# Patient Record
Sex: FEMALE | Race: WHITE | NOT HISPANIC OR LATINO | Employment: OTHER | URBAN - METROPOLITAN AREA
[De-identification: names, ages, dates, MRNs, and addresses within clinical notes are randomized per-mention and may not be internally consistent; named-entity substitution may affect disease eponyms.]

---

## 2017-01-26 ENCOUNTER — TELEPHONE (OUTPATIENT)
Dept: INTERNAL MEDICINE | Facility: CLINIC | Age: 82
End: 2017-01-26

## 2017-01-26 DIAGNOSIS — I10 ESSENTIAL HYPERTENSION: Primary | Chronic | ICD-10-CM

## 2017-01-26 DIAGNOSIS — E03.9 HYPOTHYROIDISM, UNSPECIFIED TYPE: ICD-10-CM

## 2017-01-26 NOTE — TELEPHONE ENCOUNTER
----- Message from Tawannashruthicassidydarrius Nate sent at 1/26/2017 12:34 PM CST -----  Contact: Self/[phone] home  Type: Orders Request    What orders/ testing are being requested?Lab work    Is there a future appointment scheduled for the patient with PCP?yes    When?03/16/2017    Comments:Patient is calling to request orders for lab work. He would like to speak with someone in the office once orders are placed. Please call and advise.    Thank you!

## 2017-01-26 NOTE — TELEPHONE ENCOUNTER
Pt has EPP sched for 3/16/17- she just had full labs in Dec 2016- does she need anything repeated prior to her visit in March? Please advise

## 2017-02-02 ENCOUNTER — PATIENT MESSAGE (OUTPATIENT)
Dept: INTERNAL MEDICINE | Facility: CLINIC | Age: 82
End: 2017-02-02

## 2017-02-02 DIAGNOSIS — J42 CHRONIC BRONCHITIS, UNSPECIFIED CHRONIC BRONCHITIS TYPE: Primary | ICD-10-CM

## 2017-02-09 ENCOUNTER — OFFICE VISIT (OUTPATIENT)
Dept: INTERNAL MEDICINE | Facility: CLINIC | Age: 82
End: 2017-02-09
Payer: MEDICARE

## 2017-02-09 VITALS
OXYGEN SATURATION: 97 % | HEART RATE: 89 BPM | DIASTOLIC BLOOD PRESSURE: 60 MMHG | SYSTOLIC BLOOD PRESSURE: 110 MMHG | TEMPERATURE: 98 F

## 2017-02-09 DIAGNOSIS — F02.80 ALZHEIMER'S DEMENTIA WITHOUT BEHAVIORAL DISTURBANCE, UNSPECIFIED TIMING OF DEMENTIA ONSET: ICD-10-CM

## 2017-02-09 DIAGNOSIS — L98.9 SKIN LESION: Primary | ICD-10-CM

## 2017-02-09 DIAGNOSIS — I70.0 ATHEROSCLEROSIS OF AORTA: ICD-10-CM

## 2017-02-09 DIAGNOSIS — G30.9 ALZHEIMER'S DEMENTIA WITHOUT BEHAVIORAL DISTURBANCE, UNSPECIFIED TIMING OF DEMENTIA ONSET: ICD-10-CM

## 2017-02-09 DIAGNOSIS — M35.01 KERATOCONJUNCTIVITIS SICCA: ICD-10-CM

## 2017-02-09 DIAGNOSIS — N18.30 CKD (CHRONIC KIDNEY DISEASE) STAGE 3, GFR 30-59 ML/MIN: ICD-10-CM

## 2017-02-09 PROCEDURE — 1157F ADVNC CARE PLAN IN RCRD: CPT | Mod: S$GLB,,, | Performed by: INTERNAL MEDICINE

## 2017-02-09 PROCEDURE — 99499 UNLISTED E&M SERVICE: CPT | Mod: S$GLB,,, | Performed by: INTERNAL MEDICINE

## 2017-02-09 PROCEDURE — 1126F AMNT PAIN NOTED NONE PRSNT: CPT | Mod: S$GLB,,, | Performed by: INTERNAL MEDICINE

## 2017-02-09 PROCEDURE — 1160F RVW MEDS BY RX/DR IN RCRD: CPT | Mod: S$GLB,,, | Performed by: INTERNAL MEDICINE

## 2017-02-09 PROCEDURE — 1159F MED LIST DOCD IN RCRD: CPT | Mod: S$GLB,,, | Performed by: INTERNAL MEDICINE

## 2017-02-09 PROCEDURE — 99213 OFFICE O/P EST LOW 20 MIN: CPT | Mod: S$GLB,,, | Performed by: INTERNAL MEDICINE

## 2017-02-09 PROCEDURE — 99999 PR PBB SHADOW E&M-EST. PATIENT-LVL IV: CPT | Mod: PBBFAC,,, | Performed by: INTERNAL MEDICINE

## 2017-02-09 RX ORDER — MUPIROCIN 20 MG/G
OINTMENT TOPICAL 3 TIMES DAILY
Qty: 30 G | Refills: 1 | Status: SHIPPED | OUTPATIENT
Start: 2017-02-09 | End: 2017-02-19

## 2017-02-09 NOTE — PATIENT INSTRUCTIONS
Will need Dermatology Consult if not improving or resolving. Or if residual skin lesion remains we can have Dermatology evaluate as well.

## 2017-02-09 NOTE — MR AVS SNAPSHOT
[facility] - Internal Medicine  [address]  [facility] [address]  Phone: [phone]  Fax: [phone]                  Catia Carranza   2017 9:30 AM   Office Visit    Description:  Female : 1928   Provider:  Yoseph Dinh MD   Department:  [facility] - Internal Medicine           Reason for Visit     Ear Problem           Diagnoses this Visit        Comments    Skin lesion    -  Primary Right ear, present 4 days or possibly longer.     Alzheimer's dementia without behavioral disturbance, unspecified timing of dementia onset         Atherosclerosis of aorta         CKD (chronic kidney disease) stage 3, GFR 30-59 ml/min         Keratoconjunctivitis sicca     Clinically stable. Pt has completed medication for the eye lid. Follow up with Ophtho.            To Do List           Future Appointments        Provider Department Dept Phone    3/9/2017 10:00 AM LAB, SAME DAY [facility] [phone]    3/16/2017 2:00 PM Yoseph Dinh MD [facility] Internal Medicine [phone]      Goals (5 Years of Data)     None       These Medications        Disp Refills Start End    mupirocin (BACTROBAN) 2 % ointment 30 g 1 2017    Apply topically 3 (three) times daily. - Topical (Top)    Pharmacy: [facility] - [address]  #: [phone]         [facility]     [facility] Nurse Care Line -  Assistance  Registered nurses in the [facility] provide clinical advisement, health education, appointment booking, and other advisory services.  Call for this free service at [phone].             Medications           Message regarding Medications     Verify the changes and/or additions to your medication regime listed below are the same as discussed with your clinician today.  If any of these changes or additions are incorrect, please notify your healthcare provider.        START taking  these NEW medications        Refills    mupirocin (BACTROBAN) 2 % ointment 1    Sig: Apply topically 3 (three) times daily.    Class: Normal    Route: Topical (Top)           Verify that the below list of medications is an accurate representation of the medications you are currently taking.  If none reported, the list may be blank. If incorrect, please contact your healthcare provider. Carry this list with you in case of emergency.           Current Medications     albuterol-ipratropium 2.5mg-0.5mg/3mL (DUO-NEB) 0.5 mg-3 mg(2.5 mg base)/3 mL nebulizer solution Take 3 mLs by nebulization every 6 (six) hours as needed for Wheezing.    atenolol (TENORMIN) 25 MG tablet Take 1 tablet (25 mg total) by mouth once daily.    DOCUSATE CALCIUM (STOOL SOFTENER ORAL) Take by mouth.    estradiol (ESTRACE) 0.01 % (0.1 mg/gram) vaginal cream Use 1 gram per vagina  2 times per week    gabapentin (NEURONTIN) 100 MG capsule Take 1 capsule (100 mg total) by mouth 2 (two) times daily.    hydrocodone-acetaminophen 5-325mg (NORCO) 5-325 mg per tablet Take 1 tablet by mouth 3 (three) times daily.    ketoconazole (NIZORAL) 2 % shampoo Wash scalp 3x/week, allow to soak 5 min prior to rinsing    levothyroxine (SYNTHROID) 150 MCG tablet Take 1 tablet (150 mcg total) by mouth once daily.    lisinopril (PRINIVIL,ZESTRIL) 20 MG tablet Take 1 tablet (20 mg total) by mouth once daily.    memantine (NAMENDA) 10 MG Tab Take 1 tablet (10 mg total) by mouth 2 (two) times daily.    multivitamin-minerals-lutein (CENTRUM SILVER) Tab Take 1 tablet by mouth once daily.     simvastatin (ZOCOR) 40 MG tablet Take 1 tablet (40 mg total) by mouth every evening.    mupirocin (BACTROBAN) 2 % ointment Apply topically 3 (three) times daily.           Clinical Reference Information           Your Vitals Were     BP Pulse Temp SpO2          110/60 (BP Location: Left arm, Patient Position: Sitting, BP Method: Manual) 89 97.5 °F (36.4 °C) (Oral) 97%        Blood  Pressure          Most Recent Value    BP  110/60      Allergies as of 2/9/2017     Actonel [Risedronate]    Adhesive    Other      Immunizations Administered on Date of Encounter - 2/9/2017     None      Instructions    Will need Dermatology Consult if not improving or resolving. Or if residual skin lesion remains we can have Dermatology evaluate as well.        Language Assistance Services     ATTENTION: Language assistance services are available, free of charge. Please call [phone].      ATENCIÓN: Si habla español, tiene a pham disposición servicios gratuitos de asistencia lingüística. Llame al [phone].     JANNETET Ý: N?u b?n nói Ti?ng Vi?t, có các d?ch v? h? tr? ngôn ng? mi?n phí dành cho b?n. G?i s? [phone].         [facility] complies with applicable Federal civil rights laws and does not discriminate on the basis of race, color, national origin, age, disability, or sex.

## 2017-02-09 NOTE — PROGRESS NOTES
Subjective:       Patient ID: Catia Carranza is a 88 y.o. female.    Chief Complaint: Ear Problem (sore on the outside of ear, red)    HPI Comments: History of present illness: Patient brought in by her .  He was concerned because she has a sore irritated spot on the right ear.  He said he noticed her scratching this area and she said it hurt.  When he originally looked at it it was a small crack in the skin but did not seem to be a lesion.  He denies any prior knowledge of it being a skin lesion and certainly was not a ulcer crusted lesion.  He put some cool compresses and topical antibiotic ointment to this area and over the last 2 days the redness has improved a small crust has formed.  She still says mildly sensitive but denied any inner ear pain or fever.     Review of Systems   Constitutional: Negative for fever.   HENT: Negative for congestion.    Musculoskeletal: Positive for arthralgias.   Skin:        Skin lesion right ear   Neurological: Negative for headaches.       Objective:      Physical Exam   Constitutional: She appears well-developed and well-nourished.   HENT:   Head: Normocephalic and atraumatic.   Left Ear: External ear normal.   Mouth/Throat: Oropharynx is clear and moist.   See attached photo of right ear lesion.  There was no tenderness to palpation and no drainage.  There is some mild redness.  The inner ear was unremarkable   Neck: Normal range of motion. Neck supple.   Lymphadenopathy:     She has no cervical adenopathy.           Upper portion of Right Ear, dry, mildly tender. Previously draining per , less red after Bacitracin, Neosporin.   Assessment:       1. Skin lesion    2. Alzheimer's dementia without behavioral disturbance, unspecified timing of dementia onset    3. Atherosclerosis of aorta    4. CKD (chronic kidney disease) stage 3, GFR 30-59 ml/min    5. Keratoconjunctivitis sicca        Plan:       Catia was seen today for ear problem.    Diagnoses and all  orders for this visit:    Skin lesion  Comments:  Right ear, present 4 days or possibly longer.     Alzheimer's dementia without behavioral disturbance, unspecified timing of dementia onset    Atherosclerosis of aorta    CKD (chronic kidney disease) stage 3, GFR 30-59 ml/min    Keratoconjunctivitis sicca  Comments:  Clinically stable. Pt has completed medication for the eye lid. Follow up with Ophtho.     Other orders  -     mupirocin (BACTROBAN) 2 % ointment; Apply topically 3 (three) times daily.        Call if symptoms fail to improve or worsen.  If there is an underlying skin lesion after the redness heels, may consider dermatology evaluation.

## 2017-02-19 ENCOUNTER — NURSE TRIAGE (OUTPATIENT)
Dept: ADMINISTRATIVE | Facility: CLINIC | Age: 82
End: 2017-02-19

## 2017-02-19 NOTE — TELEPHONE ENCOUNTER
Reason for Disposition   Recovered from choking  (all triage questions negative)    Protocols used: [facility] - INHALED FOREIGN BODY-A-AH    Pt inhaled half of a cough drop.  Per  no difficulty breathing, no choking and no other symptoms.  Advised  to monitor pt if she starts with fever, coughing, or difficulty breathing.

## 2017-03-01 ENCOUNTER — PATIENT MESSAGE (OUTPATIENT)
Dept: INTERNAL MEDICINE | Facility: CLINIC | Age: 82
End: 2017-03-01

## 2017-03-01 DIAGNOSIS — H61.91 SKIN LESION OF RIGHT EAR: Primary | ICD-10-CM

## 2017-03-09 ENCOUNTER — OFFICE VISIT (OUTPATIENT)
Dept: DERMATOLOGY | Facility: CLINIC | Age: 82
End: 2017-03-09
Payer: MEDICARE

## 2017-03-09 ENCOUNTER — LAB VISIT (OUTPATIENT)
Dept: LAB | Facility: HOSPITAL | Age: 82
End: 2017-03-09
Attending: INTERNAL MEDICINE
Payer: MEDICARE

## 2017-03-09 ENCOUNTER — PATIENT MESSAGE (OUTPATIENT)
Dept: INTERNAL MEDICINE | Facility: CLINIC | Age: 82
End: 2017-03-09

## 2017-03-09 DIAGNOSIS — H61.001 CDNH (CHONDRODERMATITIS NODULARIS HELICIS), RIGHT: Primary | ICD-10-CM

## 2017-03-09 DIAGNOSIS — E03.9 HYPOTHYROIDISM, UNSPECIFIED TYPE: ICD-10-CM

## 2017-03-09 DIAGNOSIS — I10 ESSENTIAL HYPERTENSION: Chronic | ICD-10-CM

## 2017-03-09 LAB
ANION GAP SERPL CALC-SCNC: 11 MMOL/L
BUN SERPL-MCNC: 29 MG/DL
CALCIUM SERPL-MCNC: 9.1 MG/DL
CHLORIDE SERPL-SCNC: 107 MMOL/L
CO2 SERPL-SCNC: 26 MMOL/L
CREAT SERPL-MCNC: 1.5 MG/DL
EST. GFR  (AFRICAN AMERICAN): 35.6 ML/MIN/1.73 M^2
EST. GFR  (NON AFRICAN AMERICAN): 30.9 ML/MIN/1.73 M^2
GLUCOSE SERPL-MCNC: 128 MG/DL
POTASSIUM SERPL-SCNC: 5.1 MMOL/L
SODIUM SERPL-SCNC: 144 MMOL/L
T4 FREE SERPL-MCNC: 1.53 NG/DL
TSH SERPL DL<=0.005 MIU/L-ACNC: <0.01 UIU/ML

## 2017-03-09 PROCEDURE — 99213 OFFICE O/P EST LOW 20 MIN: CPT | Mod: S$GLB,,, | Performed by: DERMATOLOGY

## 2017-03-09 PROCEDURE — 1160F RVW MEDS BY RX/DR IN RCRD: CPT | Mod: S$GLB,,, | Performed by: DERMATOLOGY

## 2017-03-09 PROCEDURE — 36415 COLL VENOUS BLD VENIPUNCTURE: CPT | Mod: PO

## 2017-03-09 PROCEDURE — 1159F MED LIST DOCD IN RCRD: CPT | Mod: S$GLB,,, | Performed by: DERMATOLOGY

## 2017-03-09 PROCEDURE — 84443 ASSAY THYROID STIM HORMONE: CPT

## 2017-03-09 PROCEDURE — 80048 BASIC METABOLIC PNL TOTAL CA: CPT

## 2017-03-09 PROCEDURE — 99499 UNLISTED E&M SERVICE: CPT | Mod: S$GLB,,, | Performed by: DERMATOLOGY

## 2017-03-09 PROCEDURE — 99999 PR PBB SHADOW E&M-EST. PATIENT-LVL II: CPT | Mod: PBBFAC,,, | Performed by: DERMATOLOGY

## 2017-03-09 PROCEDURE — 1157F ADVNC CARE PLAN IN RCRD: CPT | Mod: S$GLB,,, | Performed by: DERMATOLOGY

## 2017-03-09 PROCEDURE — 84439 ASSAY OF FREE THYROXINE: CPT

## 2017-03-09 RX ORDER — MOMETASONE FUROATE 1 MG/G
OINTMENT TOPICAL 2 TIMES DAILY
Qty: 30 G | Refills: 1 | Status: SHIPPED | OUTPATIENT
Start: 2017-03-09 | End: 2017-06-26

## 2017-03-09 NOTE — MR AVS SNAPSHOT
[facility] - Dermatology   [facility]  [place] [address]  Phone: [phone]  Fax: [phone]                  Catia Carranza   3/9/2017 8:20 AM   Office Visit    Description:  Female : 1928   Provider:  Cary Jacome MD   Department:  [facility] - Dermatology           Reason for Visit     Lesion           Diagnoses this Visit        Comments    CDNH (chondrodermatitis nodularis helicis), right    -  Primary            To Do List           Future Appointments        Provider Department Dept Phone    3/9/2017 8:45 AM LAB, [facility] - Laboratory [phone]    3/16/2017 2:00 PM Yoseph Dinh MD [facility] - Internal Medicine [phone]      Goals (5 Years of Data)     None       These Medications        Disp Refills Start End    mometasone (ELOCON) 0.1 % ointment 30 g 1 3/9/2017 3/19/2017    Apply topically 2 (two) times daily. To right ear lesion - Topical (Top)    Pharmacy: [facility] - [address] Ph #: [phone]         [facility]     [facility] On [facility] Nurse Care Line -  Assistance  Registered nurses in the [facility] provide clinical advisement, health education, appointment booking, and other advisory services.  Call for this free service at [phone].             Medications           Message regarding Medications     Verify the changes and/or additions to your medication regime listed below are the same as discussed with your clinician today.  If any of these changes or additions are incorrect, please notify your healthcare provider.        START taking these NEW medications        Refills    mometasone (ELOCON) 0.1 % ointment 1    Sig: Apply topically 2 (two) times daily. To right ear lesion    Class: Normal    Route: Topical (Top)           Verify that the below list of medications is an accurate representation of the medications you are currently taking.  If none reported, the  list may be blank. If incorrect, please contact your healthcare provider. Carry this list with you in case of emergency.           Current Medications     albuterol-ipratropium 2.5mg-0.5mg/3mL (DUO-NEB) 0.5 mg-3 mg(2.5 mg base)/3 mL nebulizer solution Take 3 mLs by nebulization every 6 (six) hours as needed for Wheezing.    atenolol (TENORMIN) 25 MG tablet Take 1 tablet (25 mg total) by mouth once daily.    DOCUSATE CALCIUM (STOOL SOFTENER ORAL) Take by mouth.    estradiol (ESTRACE) 0.01 % (0.1 mg/gram) vaginal cream Use 1 gram per vagina  2 times per week    gabapentin (NEURONTIN) 100 MG capsule Take 1 capsule (100 mg total) by mouth 2 (two) times daily.    hydrocodone-acetaminophen 5-325mg (NORCO) 5-325 mg per tablet Take 1 tablet by mouth 3 (three) times daily.    ketoconazole (NIZORAL) 2 % shampoo Wash scalp 3x/week, allow to soak 5 min prior to rinsing    levothyroxine (SYNTHROID) 150 MCG tablet Take 1 tablet (150 mcg total) by mouth once daily.    lisinopril (PRINIVIL,ZESTRIL) 20 MG tablet Take 1 tablet (20 mg total) by mouth once daily.    memantine (NAMENDA) 10 MG Tab Take 1 tablet (10 mg total) by mouth 2 (two) times daily.    mometasone (ELOCON) 0.1 % ointment Apply topically 2 (two) times daily. To right ear lesion    multivitamin-minerals-lutein (CENTRUM SILVER) Tab Take 1 tablet by mouth once daily.     simvastatin (ZOCOR) 40 MG tablet Take 1 tablet (40 mg total) by mouth every evening.           Clinical Reference Information           Allergies as of 3/9/2017     Actonel [Risedronate]    Adhesive    Other      Immunizations Administered on Date of Encounter - 3/9/2017     None      Instructions    Discussed contributing factors of pressure     Avoid sleeping on affected side. Consider use of donut pillow or corn remover bandaid to alleviate pressure while sleeping. Can also try a donut shaped pillow for sleeping.    Discussed risks and benefits of treatment options (Cryosurgery, shave removal).  Lesions frequently recur.         Language Assistance Services     ATTENTION: Language assistance services are available, free of charge. Please call [phone].      ATENCIÓN: Si habla camila, tiene a pham disposición servicios gratuitos de asistencia lingüística. Llame al [phone].     CHÚ Ý: N?u b?n nói Ti?ng Vi?t, có các d?ch v? h? tr? ngôn ng? mi?n phí dành cho b?n. G?i s? [phone].         [facility] complies with applicable Federal civil rights laws and does not discriminate on the basis of race, color, national origin, age, disability, or sex.

## 2017-03-09 NOTE — PROGRESS NOTES
Subjective:       Patient ID:  Catia Carranza is a 88 y.o. female who presents for   Chief Complaint   Patient presents with    Lesion     r ear     HPI Comments: Pt presents for lesion to right ear x 2 months.  Painful.  Went to PCP who prescribed topical antibiotic.  Used for 1 month  with no change.  Made worse by pressure.       Lesion         Review of Systems   Constitutional: Negative for fever, chills, fatigue and malaise.   Skin: Negative for daily sunscreen use.   Hematologic/Lymphatic: Does not bruise/bleed easily.        Objective:    Physical Exam   Constitutional: She is in a wheelchair.    HENT:   Ears:    Skin:   Areas Examined (abnormalities noted in diagram):   Head / Face Inspection Performed         Diagram Legend     Erythematous scaling macule/papule c/w actinic keratosis       Vascular papule c/w angioma      Pigmented verrucoid papule/plaque c/w seborrheic keratosis      Yellow umbilicated papule c/w sebaceous hyperplasia      Irregularly shaped tan macule c/w lentigo     1-2 mm smooth white papules consistent with Milia      Movable subcutaneous cyst with punctum c/w epidermal inclusion cyst      Subcutaneous movable cyst c/w pilar cyst      Firm pink to brown papule c/w dermatofibroma      Pedunculated fleshy papule(s) c/w skin tag(s)      Evenly pigmented macule c/w junctional nevus     Mildly variegated pigmented, slightly irregular-bordered macule c/w mildly atypical nevus      Flesh colored to evenly pigmented papule c/w intradermal nevus       Pink pearly papule/plaque c/w basal cell carcinoma      Erythematous hyperkeratotic cursted plaque c/w SCC      Surgical scar with no sign of skin cancer recurrence      Open and closed comedones      Inflammatory papules and pustules      Verrucoid papule consistent consistent with wart     Erythematous eczematous patches and plaques     Dystrophic onycholytic nail with subungual debris c/w onychomycosis     Umbilicated papule     Erythematous-base heme-crusted tan verrucoid plaque consistent with inflamed seborrheic keratosis     Erythematous Silvery Scaling Plaque c/w Psoriasis     See annotation      Assessment / Plan:        CDNH (chondrodermatitis nodularis helicis), right  -     mometasone (ELOCON) 0.1 % ointment; Apply topically 2 (two) times daily. To right ear lesion  Dispense: 30 g; Refill: 1  Discussed contributing factors of pressure and UV damage.     Avoid sleeping on affected side. Consider use of donut pillow, donut shaped bandaids    Discussed risks and benefits of treatment options (Cryosurgery, shave removal). Lesions frequently recur.  Pt has dementia and  is present.  He states she may be difficult to be compliant with bandaids and cream  He will call if she does not improve             Return if symptoms worsen or fail to improve.

## 2017-03-09 NOTE — PATIENT INSTRUCTIONS
Discussed contributing factors of pressure     Avoid sleeping on affected side. Consider use of donut pillow or corn remover bandaid to alleviate pressure while sleeping. Can also try a donut shaped pillow for sleeping.    Discussed risks and benefits of treatment options (Cryosurgery, shave removal). Lesions frequently recur.

## 2017-03-16 ENCOUNTER — OFFICE VISIT (OUTPATIENT)
Dept: INTERNAL MEDICINE | Facility: CLINIC | Age: 82
End: 2017-03-16
Payer: MEDICARE

## 2017-03-16 VITALS — DIASTOLIC BLOOD PRESSURE: 72 MMHG | SYSTOLIC BLOOD PRESSURE: 118 MMHG | HEART RATE: 77 BPM

## 2017-03-16 DIAGNOSIS — Z00.00 ROUTINE PHYSICAL EXAMINATION: Primary | ICD-10-CM

## 2017-03-16 DIAGNOSIS — G89.4 CHRONIC PAIN SYNDROME: ICD-10-CM

## 2017-03-16 DIAGNOSIS — E03.9 HYPOTHYROIDISM, UNSPECIFIED TYPE: ICD-10-CM

## 2017-03-16 DIAGNOSIS — K59.09 CHRONIC CONSTIPATION: ICD-10-CM

## 2017-03-16 DIAGNOSIS — G30.9 ALZHEIMER'S DEMENTIA WITHOUT BEHAVIORAL DISTURBANCE, UNSPECIFIED TIMING OF DEMENTIA ONSET: ICD-10-CM

## 2017-03-16 DIAGNOSIS — E78.5 HYPERLIPIDEMIA, UNSPECIFIED HYPERLIPIDEMIA TYPE: ICD-10-CM

## 2017-03-16 DIAGNOSIS — F02.80 ALZHEIMER'S DEMENTIA WITHOUT BEHAVIORAL DISTURBANCE, UNSPECIFIED TIMING OF DEMENTIA ONSET: ICD-10-CM

## 2017-03-16 DIAGNOSIS — E78.5 DYSLIPIDEMIA: ICD-10-CM

## 2017-03-16 DIAGNOSIS — I70.0 ATHEROSCLEROSIS OF AORTA: ICD-10-CM

## 2017-03-16 DIAGNOSIS — N18.30 CKD (CHRONIC KIDNEY DISEASE) STAGE 3, GFR 30-59 ML/MIN: ICD-10-CM

## 2017-03-16 DIAGNOSIS — F11.90 CHRONIC, CONTINUOUS USE OF OPIOIDS: Chronic | ICD-10-CM

## 2017-03-16 DIAGNOSIS — F41.9 ANXIETY: ICD-10-CM

## 2017-03-16 DIAGNOSIS — I10 ESSENTIAL HYPERTENSION: Chronic | ICD-10-CM

## 2017-03-16 PROCEDURE — 99999 PR PBB SHADOW E&M-EST. PATIENT-LVL III: CPT | Mod: PBBFAC,,, | Performed by: INTERNAL MEDICINE

## 2017-03-16 PROCEDURE — 99397 PER PM REEVAL EST PAT 65+ YR: CPT | Mod: S$GLB,,, | Performed by: INTERNAL MEDICINE

## 2017-03-16 PROCEDURE — 99499 UNLISTED E&M SERVICE: CPT | Mod: S$GLB,,, | Performed by: INTERNAL MEDICINE

## 2017-03-16 RX ORDER — LISINOPRIL 20 MG/1
20 TABLET ORAL DAILY
Qty: 90 TABLET | Refills: 3 | Status: SHIPPED | OUTPATIENT
Start: 2017-03-16 | End: 2018-03-12 | Stop reason: SDUPTHER

## 2017-03-16 RX ORDER — MEMANTINE HYDROCHLORIDE 10 MG/1
10 TABLET ORAL 2 TIMES DAILY
Qty: 180 TABLET | Refills: 3 | Status: SHIPPED | OUTPATIENT
Start: 2017-03-16 | End: 2018-03-12 | Stop reason: SDUPTHER

## 2017-03-16 RX ORDER — LEVOTHYROXINE SODIUM 125 UG/1
125 TABLET ORAL DAILY
Qty: 90 TABLET | Refills: 4 | Status: SHIPPED | OUTPATIENT
Start: 2017-03-16 | End: 2018-03-07 | Stop reason: SDUPTHER

## 2017-03-16 RX ORDER — HYDROCODONE BITARTRATE AND ACETAMINOPHEN 5; 325 MG/1; MG/1
1 TABLET ORAL 3 TIMES DAILY
Qty: 270 TABLET | Refills: 0 | Status: SHIPPED | OUTPATIENT
Start: 2017-03-16 | End: 2017-06-26 | Stop reason: SDUPTHER

## 2017-03-16 RX ORDER — ATENOLOL 25 MG/1
25 TABLET ORAL DAILY
Qty: 90 TABLET | Refills: 4 | Status: SHIPPED | OUTPATIENT
Start: 2017-03-16 | End: 2017-10-09

## 2017-03-16 RX ORDER — GABAPENTIN 100 MG/1
100 CAPSULE ORAL 2 TIMES DAILY
Qty: 180 CAPSULE | Refills: 3 | Status: SHIPPED | OUTPATIENT
Start: 2017-03-16 | End: 2018-03-12 | Stop reason: SDUPTHER

## 2017-03-16 NOTE — PROGRESS NOTES
Subjective:       Patient ID: Catia Carranza is a 88 y.o. female.    Chief Complaint: Annual Exam    HPI Comments: History of present illness: 88-year-old female with moderate dementia is attended by her  Cristian.  She comes in for annual exam.  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  She has had some mild renal physicians and hypothyroidism.  She is fairly well taking care of by her  despite both of their ages.  Periodically there is some help from some children but not on a regular basis.  Review of labs show mild decrease in estimated GFR down to 30.9.  Free T4 is up in TSH is down so I would like to reduce the thyroid medication.  Sugar and other labs are fairly stable.  Patient denies any  complaints but the  says she is constipated at times.  More water and fiber may help.  This may be coming from the pain meds and her inactivity as well     Review of Systems   Constitutional: Negative for appetite change, chills and fever.   HENT: Negative for sore throat.    Respiratory: Negative for cough, shortness of breath and wheezing.    Cardiovascular: Positive for leg swelling (trace). Negative for chest pain.   Gastrointestinal: Positive for constipation. Negative for abdominal pain and diarrhea.   Genitourinary: Negative for difficulty urinating.   Musculoskeletal: Positive for arthralgias, back pain and gait problem. Negative for neck pain and neck stiffness.   Skin: Negative for rash.   Psychiatric/Behavioral: Positive for confusion.       Objective:      Physical Exam   Constitutional: She appears well-developed and well-nourished. No distress.   HENT:   Head: Normocephalic and atraumatic.   Right Ear: External ear normal.   Left Ear: External ear normal.   Mouth/Throat: Oropharynx is clear and moist. No oropharyngeal exudate.   Eyes: Conjunctivae are normal. Pupils are equal, round, and reactive to light. No scleral  icterus.   Neck: Normal range of motion. Neck supple. No thyromegaly present.   Cardiovascular: Normal rate and regular rhythm.    No murmur heard.  Pulmonary/Chest: Effort normal and breath sounds normal. She has no wheezes.   Abdominal: Soft. Bowel sounds are normal. She exhibits no distension. There is no tenderness.   Musculoskeletal: She exhibits edema and tenderness (knees and low back).   Lymphadenopathy:     She has no cervical adenopathy.   Neurological: She is alert.   Skin: No rash noted.   Crusted lesion right ear seen by dermatology and felt to be contact related.  Topical treatment ongoing with possible surgery to follow   Psychiatric: She has a normal mood and affect.       Assessment:       1. Routine physical examination    2. Alzheimer's dementia without behavioral disturbance, unspecified timing of dementia onset    3. Anxiety    4. Chronic pain syndrome    5. Chronic, continuous use of opioids    6. Atherosclerosis of aorta    7. Essential hypertension    8. CKD (chronic kidney disease) stage 3, GFR 30-59 ml/min    9. Chronic constipation    10. Dyslipidemia    11. Hyperlipidemia, unspecified hyperlipidemia type    12. Hypothyroidism, unspecified type        Plan:       Catia was seen today for annual exam.    Diagnoses and all orders for this visit:    Routine physical examination    Alzheimer's dementia without behavioral disturbance, unspecified timing of dementia onset  -     memantine (NAMENDA) 10 MG Tab; Take 1 tablet (10 mg total) by mouth 2 (two) times daily.    Anxiety    Chronic pain syndrome    Chronic, continuous use of opioids    Atherosclerosis of aorta    Essential hypertension  -     atenolol (TENORMIN) 25 MG tablet; Take 1 tablet (25 mg total) by mouth once daily.    CKD (chronic kidney disease) stage 3, GFR 30-59 ml/min  -     Basic metabolic panel; Future    Chronic constipation    Dyslipidemia    Hyperlipidemia, unspecified hyperlipidemia type    Hypothyroidism, unspecified  type  -     TSH; Future    Other orders  -     levothyroxine (SYNTHROID) 125 MCG tablet; Take 1 tablet (125 mcg total) by mouth once daily.  -     lisinopril (PRINIVIL,ZESTRIL) 20 MG tablet; Take 1 tablet (20 mg total) by mouth once daily.  -     gabapentin (NEURONTIN) 100 MG capsule; Take 1 capsule (100 mg total) by mouth 2 (two) times daily.  -     hydrocodone-acetaminophen 5-325mg (NORCO) 5-325 mg per tablet; Take 1 tablet by mouth 3 (three) times daily.        Follow-up labs in a few weeks.  Try to increase hydration with water

## 2017-03-16 NOTE — MR AVS SNAPSHOT
[facility]  [address]  [facility] [address]  Phone: [phone]  Fax: [phone]                  Catia Carranza   3/16/2017 2:00 PM   Office Visit    Description:  Female : 1928   Provider:  Yoseph Dinh MD   Department:  [facility]           Reason for Visit     Annual Exam           Diagnoses this Visit        Comments    Routine physical examination    -  Primary     Alzheimer's dementia without behavioral disturbance, unspecified timing of dementia onset         Anxiety         Chronic pain syndrome         Chronic, continuous use of opioids         Atherosclerosis of aorta         Essential hypertension         CKD (chronic kidney disease) stage 3, GFR 30-59 ml/min         Chronic constipation         Dyslipidemia         Hyperlipidemia, unspecified hyperlipidemia type         Hypothyroidism, unspecified type                To Do List           Future Appointments        Provider Department Dept Phone    2017 1:00 PM LAB, APPOINTMENT [facility] [phone]    2017 1:00 PM Yoseph Dinh MD [facility] [phone]      Goals (5 Years of Data)     None      Follow-Up and Disposition     Return in about 6 weeks (around 2017) for EP, with labs.       These Medications        Disp Refills Start End    levothyroxine (SYNTHROID) 125 MCG tablet 90 tablet 4 3/16/2017     Take 1 tablet (125 mcg total) by mouth once daily. - Oral    Pharmacy: [facility] - [place] [id number] [facility] Ph #: [phone]       memantine (NAMENDA) 10 MG Tab 180 tablet 3 3/16/2017 3/16/2018    Take 1 tablet (10 mg total) by mouth 2 (two) times daily. - Oral    Pharmacy: [facility] - [facility] [address] [facility] Ph #: [phone]       lisinopril (PRINIVIL,ZESTRIL) 20 MG tablet 90 tablet 3 3/16/2017 3/16/2018    Take 1 tablet (20 mg total)  by mouth once daily. - Oral    Pharmacy: [facility] - [facility] [address] [facility] Ph #: [phone]       gabapentin (NEURONTIN) 100 MG capsule 180 capsule 3 3/16/2017 3/16/2018    Take 1 capsule (100 mg total) by mouth 2 (two) times daily. - Oral    Pharmacy: [facility] - [facility] [address] [facility] Ph #: [phone]       atenolol (TENORMIN) 25 MG tablet 90 tablet 4 3/16/2017     Take 1 tablet (25 mg total) by mouth once daily. - Oral    Pharmacy: [facility] - [facility] [address] [facility] Ph #: [phone]       hydrocodone-acetaminophen 5-325mg (NORCO) 5-325 mg per tablet 270 tablet 0 3/16/2017     Take 1 tablet by mouth 3 (three) times daily. - Oral    Pharmacy: [facility] - [facility] [address] [facility] Ph #: [phone]         [facility]     [facility] On Call Nurse Care Line - 24/7 Assistance  Registered nurses in the [facility] On Call Center provide clinical advisement, health education, appointment booking, and other advisory services.  Call for this free service at [phone].             Medications           Message regarding Medications     Verify the changes and/or additions to your medication regime listed below are the same as discussed with your clinician today.  If any of these changes or additions are incorrect, please notify your healthcare provider.        CHANGE how you are taking these medications     Start Taking Instead of    levothyroxine (SYNTHROID) 125 MCG tablet levothyroxine (SYNTHROID) 150 MCG tablet    Dosage:  Take 1 tablet (125 mcg total) by mouth once daily. Dosage:  Take 1 tablet (150 mcg total) by mouth once daily.    Reason for Change:  Reorder            Verify that the below list of medications is an accurate representation of the medications you are currently taking.  If none reported, the list may be blank. If incorrect, please contact your healthcare provider.  Carry this list with you in case of emergency.           Current Medications     albuterol-ipratropium 2.5mg-0.5mg/3mL (DUO-NEB) 0.5 mg-3 mg(2.5 mg base)/3 mL nebulizer solution Take 3 mLs by nebulization every 6 (six) hours as needed for Wheezing.    atenolol (TENORMIN) 25 MG tablet Take 1 tablet (25 mg total) by mouth once daily.    DOCUSATE CALCIUM (STOOL SOFTENER ORAL) Take by mouth.    estradiol (ESTRACE) 0.01 % (0.1 mg/gram) vaginal cream Use 1 gram per vagina  2 times per week    gabapentin (NEURONTIN) 100 MG capsule Take 1 capsule (100 mg total) by mouth 2 (two) times daily.    hydrocodone-acetaminophen 5-325mg (NORCO) 5-325 mg per tablet Take 1 tablet by mouth 3 (three) times daily.    ketoconazole (NIZORAL) 2 % shampoo Wash scalp 3x/week, allow to soak 5 min prior to rinsing    levothyroxine (SYNTHROID) 125 MCG tablet Take 1 tablet (125 mcg total) by mouth once daily.    memantine (NAMENDA) 10 MG Tab Take 1 tablet (10 mg total) by mouth 2 (two) times daily.    mometasone (ELOCON) 0.1 % ointment Apply topically 2 (two) times daily. To right ear lesion    multivitamin-minerals-lutein (CENTRUM SILVER) Tab Take 1 tablet by mouth once daily.     simvastatin (ZOCOR) 40 MG tablet Take 1 tablet (40 mg total) by mouth every evening.    lisinopril (PRINIVIL,ZESTRIL) 20 MG tablet Take 1 tablet (20 mg total) by mouth once daily.           Clinical Reference Information           Your Vitals Were     BP Pulse                118/72 77          Blood Pressure          Most Recent Value    BP  118/72      Allergies as of 3/16/2017     Actonel [Risedronate]    Adhesive    Other      Immunizations Administered on Date of Encounter - 3/16/2017     None      Orders Placed During Today's Visit     Future Labs/Procedures Expected by Expires    Basic metabolic panel  3/16/2017 3/16/2018    TSH  3/16/2017 3/16/2018      Language Assistance Services     ATTENTION: Language assistance services are available, free of charge. Please  call [phone].      ATENCIÓN: Si habla español, tiene a pham disposición servicios gratuitos de asistencia lingüística. Llame al [phone].     CHÚ Ý: N?u b?n nói Ti?ng Vi?t, có các d?ch v? h? tr? ngôn ng? mi?n phí dành cho b?n. G?i s? [phone].         [facility] complies with applicable Federal civil rights laws and does not discriminate on the basis of race, color, national origin, age, disability, or sex.

## 2017-04-13 ENCOUNTER — OFFICE VISIT (OUTPATIENT)
Dept: DERMATOLOGY | Facility: CLINIC | Age: 82
End: 2017-04-13
Payer: MEDICARE

## 2017-04-13 DIAGNOSIS — D48.9 NEOPLASM OF UNCERTAIN BEHAVIOR: Primary | ICD-10-CM

## 2017-04-13 PROCEDURE — 99999 PR PBB SHADOW E&M-EST. PATIENT-LVL II: CPT | Mod: PBBFAC,,, | Performed by: DERMATOLOGY

## 2017-04-13 PROCEDURE — 11100 PR BIOPSY OF SKIN LESION: CPT | Mod: S$GLB,,, | Performed by: DERMATOLOGY

## 2017-04-13 PROCEDURE — 99499 UNLISTED E&M SERVICE: CPT | Mod: S$GLB,,, | Performed by: DERMATOLOGY

## 2017-04-13 PROCEDURE — 88305 TISSUE EXAM BY PATHOLOGIST: CPT | Performed by: PATHOLOGY

## 2017-04-13 RX ORDER — MUPIROCIN 20 MG/G
OINTMENT TOPICAL
Qty: 30 G | Refills: 2 | Status: SHIPPED | OUTPATIENT
Start: 2017-04-13

## 2017-04-13 NOTE — PROGRESS NOTES
Subjective:       Patient ID:  Catia Carranza is a 88 y.o. female who presents for   Chief Complaint   Patient presents with    Follow-up     HPI Comments: Pt here today for a follow up on CDNH (chondrodermatitis nodularis helicis), right ear. tx with  mometasone (ELOCON) 0.1 % ointment. Lesion in still very painful. Is using donut cushion to sleep and this is not helping either.     -      Review of Systems   Skin: Negative for tendency to form keloidal scars.   Hematologic/Lymphatic: Does not bruise/bleed easily.        Objective:    Physical Exam   Constitutional: She is in a wheelchair.    HENT:   Ears:    Neurological: She is disoriented.   Skin:   Areas Examined (abnormalities noted in diagram):   Head / Face Inspection Performed             Diagram Legend     Erythematous scaling macule/papule c/w actinic keratosis       Vascular papule c/w angioma      Pigmented verrucoid papule/plaque c/w seborrheic keratosis      Yellow umbilicated papule c/w sebaceous hyperplasia      Irregularly shaped tan macule c/w lentigo     1-2 mm smooth white papules consistent with Milia      Movable subcutaneous cyst with punctum c/w epidermal inclusion cyst      Subcutaneous movable cyst c/w pilar cyst      Firm pink to brown papule c/w dermatofibroma      Pedunculated fleshy papule(s) c/w skin tag(s)      Evenly pigmented macule c/w junctional nevus     Mildly variegated pigmented, slightly irregular-bordered macule c/w mildly atypical nevus      Flesh colored to evenly pigmented papule c/w intradermal nevus       Pink pearly papule/plaque c/w basal cell carcinoma      Erythematous hyperkeratotic cursted plaque c/w SCC      Surgical scar with no sign of skin cancer recurrence      Open and closed comedones      Inflammatory papules and pustules      Verrucoid papule consistent consistent with wart     Erythematous eczematous patches and plaques     Dystrophic onycholytic nail with subungual debris c/w onychomycosis      Umbilicated papule    Erythematous-base heme-crusted tan verrucoid plaque consistent with inflamed seborrheic keratosis     Erythematous Silvery Scaling Plaque c/w Psoriasis     See annotation      Assessment / Plan:      Pathology Orders:      Normal Orders This Visit    Tissue Specimen To Pathology, Dermatology     Questions:    Directional Terms:  Other(comment)    Clinical information:  r/o CDNH v SCC    Specific Site:  right superior antihelix        Neoplasm of uncertain behavior  Shave biopsy procedure note:    Shave biopsy performed after verbal consent including risk of infection, scar, recurrence, need for additional treatment of site. Area prepped with alcohol, anesthetized with approximately 1.0cc of 1% lidocaine with epinephrine. Lesional tissue shaved with razor blade. Hemostasis achieved with application of aluminum chloride followed by hyfrecation. No complications. Dressing applied. Wound care explained.    If biopsy positive for malignancy, refer to Dr. Russ for Mohs surgery consultation.    -     Tissue Specimen To Pathology, Dermatology  bactroban ointment for wound care   Consult wound care if neg for bandage options          Return for prn bx report.

## 2017-04-22 ENCOUNTER — NURSE TRIAGE (OUTPATIENT)
Dept: ADMINISTRATIVE | Facility: CLINIC | Age: 82
End: 2017-04-22

## 2017-04-22 NOTE — TELEPHONE ENCOUNTER
"  Reason for Disposition   [1] SEVERE pain AND [2] not improved 2 hours after taking analgesic medication (e.g., ibuprofen or acetaminophen)    Answer Assessment - Initial Assessment Questions  1. LOCATION: "Which ear is involved?"      right  2. ONSET: "When did the ear start hurting"       A few months   3. SEVERITY: "How bad is the pain?"  (Scale 1-10; mild, moderate or severe)    - MILD (1-3): doesn't interfere with normal activities     - MODERATE (4-7): interferes with normal activities or awakens from sleep     - SEVERE (8-10): excruciating pain, unable to do any normal activities       severe  4. URI SYMPTOMS: " Do you have a runny nose or cough?"      uri  5. FEVER: "Do you have a fever?" If so, ask: "What is your temperature, how was it measured, and when did it start?"      no  6. CAUSE: "Have you been swimming recently?", "How often do you use Q-TIPS?", "Have you had any recent air travel or scuba diving?"      no  7. OTHER SYMPTOMS: "Do you have any other symptoms?" (e.g., headache, stiff neck, dizziness, vomiting, runny nose, decreased hearing)      no  8. PREGNANCY: "Is there any chance you are pregnant?" "When was your last menstrual period?"      n/a    Protocols used:  EARACHE-A-    "

## 2017-04-24 ENCOUNTER — PATIENT MESSAGE (OUTPATIENT)
Dept: DERMATOLOGY | Facility: CLINIC | Age: 82
End: 2017-04-24

## 2017-04-24 ENCOUNTER — LAB VISIT (OUTPATIENT)
Dept: LAB | Facility: HOSPITAL | Age: 82
End: 2017-04-24
Attending: INTERNAL MEDICINE
Payer: MEDICARE

## 2017-04-24 DIAGNOSIS — N18.30 CKD (CHRONIC KIDNEY DISEASE) STAGE 3, GFR 30-59 ML/MIN: ICD-10-CM

## 2017-04-24 DIAGNOSIS — E03.9 HYPOTHYROIDISM, UNSPECIFIED TYPE: ICD-10-CM

## 2017-04-24 LAB
ANION GAP SERPL CALC-SCNC: 10 MMOL/L
BUN SERPL-MCNC: 24 MG/DL
CALCIUM SERPL-MCNC: 9.4 MG/DL
CHLORIDE SERPL-SCNC: 104 MMOL/L
CO2 SERPL-SCNC: 26 MMOL/L
CREAT SERPL-MCNC: 1.2 MG/DL
EST. GFR  (AFRICAN AMERICAN): 46.6 ML/MIN/1.73 M^2
EST. GFR  (NON AFRICAN AMERICAN): 40.4 ML/MIN/1.73 M^2
GLUCOSE SERPL-MCNC: 176 MG/DL
POTASSIUM SERPL-SCNC: 5 MMOL/L
SODIUM SERPL-SCNC: 140 MMOL/L
TSH SERPL DL<=0.005 MIU/L-ACNC: 0.02 UIU/ML

## 2017-04-24 PROCEDURE — 84439 ASSAY OF FREE THYROXINE: CPT

## 2017-04-24 PROCEDURE — 80048 BASIC METABOLIC PNL TOTAL CA: CPT

## 2017-04-24 PROCEDURE — 84443 ASSAY THYROID STIM HORMONE: CPT

## 2017-04-24 PROCEDURE — 36415 COLL VENOUS BLD VENIPUNCTURE: CPT

## 2017-04-25 ENCOUNTER — TELEPHONE (OUTPATIENT)
Dept: DERMATOLOGY | Facility: CLINIC | Age: 82
End: 2017-04-25

## 2017-04-25 LAB — T4 FREE SERPL-MCNC: 1.34 NG/DL

## 2017-04-26 ENCOUNTER — OFFICE VISIT (OUTPATIENT)
Dept: INTERNAL MEDICINE | Facility: CLINIC | Age: 82
End: 2017-04-26
Payer: MEDICARE

## 2017-04-26 VITALS
WEIGHT: 190 LBS | DIASTOLIC BLOOD PRESSURE: 64 MMHG | HEART RATE: 69 BPM | SYSTOLIC BLOOD PRESSURE: 128 MMHG | BODY MASS INDEX: 34.75 KG/M2 | OXYGEN SATURATION: 97 %

## 2017-04-26 DIAGNOSIS — E07.9 THYROID DISEASE: ICD-10-CM

## 2017-04-26 DIAGNOSIS — M47.16 OSTEOARTHRITIS OF LUMBAR SPINE WITH MYELOPATHY: ICD-10-CM

## 2017-04-26 DIAGNOSIS — G89.4 CHRONIC PAIN SYNDROME: ICD-10-CM

## 2017-04-26 DIAGNOSIS — N18.30 CKD (CHRONIC KIDNEY DISEASE) STAGE 3, GFR 30-59 ML/MIN: Primary | ICD-10-CM

## 2017-04-26 DIAGNOSIS — F02.80 ALZHEIMER'S DEMENTIA WITHOUT BEHAVIORAL DISTURBANCE, UNSPECIFIED TIMING OF DEMENTIA ONSET: ICD-10-CM

## 2017-04-26 DIAGNOSIS — G30.9 ALZHEIMER'S DEMENTIA WITHOUT BEHAVIORAL DISTURBANCE, UNSPECIFIED TIMING OF DEMENTIA ONSET: ICD-10-CM

## 2017-04-26 PROCEDURE — 1126F AMNT PAIN NOTED NONE PRSNT: CPT | Mod: S$GLB,,, | Performed by: INTERNAL MEDICINE

## 2017-04-26 PROCEDURE — 99999 PR PBB SHADOW E&M-EST. PATIENT-LVL III: CPT | Mod: PBBFAC,,, | Performed by: INTERNAL MEDICINE

## 2017-04-26 PROCEDURE — 1157F ADVNC CARE PLAN IN RCRD: CPT | Mod: S$GLB,,, | Performed by: INTERNAL MEDICINE

## 2017-04-26 PROCEDURE — 1159F MED LIST DOCD IN RCRD: CPT | Mod: S$GLB,,, | Performed by: INTERNAL MEDICINE

## 2017-04-26 PROCEDURE — 99214 OFFICE O/P EST MOD 30 MIN: CPT | Mod: S$GLB,,, | Performed by: INTERNAL MEDICINE

## 2017-04-26 PROCEDURE — 1160F RVW MEDS BY RX/DR IN RCRD: CPT | Mod: S$GLB,,, | Performed by: INTERNAL MEDICINE

## 2017-04-26 NOTE — MR AVS SNAPSHOT
[facility]  [address]  [facility] [address]  Phone: [phone]  Fax: [phone]                  Catia Carranza   2017 1:00 PM   Office Visit    Description:  Female : 1928   Provider:  Yoseph Dinh MD   Department:  [facility]           Reason for Visit     Follow-up           Diagnoses this Visit        Comments    Osteoarthritis of lumbar spine with myelopathy    -  Primary     Chronic pain syndrome         Alzheimer's dementia without behavioral disturbance, unspecified timing of dementia onset         CKD (chronic kidney disease) stage 3, GFR 30-59 ml/min         Thyroid disease                To Do List           Future Appointments        Provider Department Dept Phone    2017 1:00 PM Yoseph Dinh MD [facility] [phone]      Goals (5 Years of Data)     None      [facility]     [facility] On Call Nurse Care Line -  Assistance  Unless otherwise directed by your provider, please contact [facility], our nurse care line that is available for  assistance.     Registered nurses in the [facility] On Call Center provide: appointment scheduling, clinical advisement, health education, and other advisory services.  Call: [phone] (toll free)               Medications           Message regarding Medications     Verify the changes and/or additions to your medication regime listed below are the same as discussed with your clinician today.  If any of these changes or additions are incorrect, please notify your healthcare provider.             Verify that the below list of medications is an accurate representation of the medications you are currently taking.  If none reported, the list may be blank. If incorrect, please contact your healthcare provider. Carry this list with you in case of emergency.           Current Medications     atenolol (TENORMIN) 25 MG tablet Take 1 tablet (25 mg total) by  mouth once daily.    DOCUSATE CALCIUM (STOOL SOFTENER ORAL) Take by mouth.    estradiol (ESTRACE) 0.01 % (0.1 mg/gram) vaginal cream Use 1 gram per vagina  2 times per week    gabapentin (NEURONTIN) 100 MG capsule Take 1 capsule (100 mg total) by mouth 2 (two) times daily.    hydrocodone-acetaminophen 5-325mg (NORCO) 5-325 mg per tablet Take 1 tablet by mouth 3 (three) times daily.    ketoconazole (NIZORAL) 2 % shampoo Wash scalp 3x/week, allow to soak 5 min prior to rinsing    levothyroxine (SYNTHROID) 125 MCG tablet Take 1 tablet (125 mcg total) by mouth once daily.    lisinopril (PRINIVIL,ZESTRIL) 20 MG tablet Take 1 tablet (20 mg total) by mouth once daily.    memantine (NAMENDA) 10 MG Tab Take 1 tablet (10 mg total) by mouth 2 (two) times daily.    multivitamin-minerals-lutein (CENTRUM SILVER) Tab Take 1 tablet by mouth once daily.     mupirocin (BACTROBAN) 2 % ointment AAA bid    simvastatin (ZOCOR) 40 MG tablet Take 1 tablet (40 mg total) by mouth every evening.    albuterol-ipratropium 2.5mg-0.5mg/3mL (DUO-NEB) 0.5 mg-3 mg(2.5 mg base)/3 mL nebulizer solution Take 3 mLs by nebulization every 6 (six) hours as needed for Wheezing.    mometasone (ELOCON) 0.1 % ointment Apply topically 2 (two) times daily. To right ear lesion           Clinical Reference Information           Your Vitals Were     BP Pulse Weight SpO2 BMI    128/64 (BP Location: Left arm, Patient Position: Sitting, BP Method: Manual) 69 86.2 kg (190 lb) 97% 34.75 kg/m2      Blood Pressure          Most Recent Value    BP  128/64      Allergies as of 4/26/2017     Actonel [Risedronate]    Adhesive    Other      Immunizations Administered on Date of Encounter - 4/26/2017     None      Language Assistance Services     ATTENTION: Language assistance services are available, free of charge. Please call [phone].      ATENCIÓN: Si habla nestorañol, tiene a pham disposición servicios gratuitos de asistencia lingüística. Llame al [phone].     CHÚ  Ý: N?u b?n nói Ti?ng Vi?t, có các d?ch v? h? tr? ngôn ng? mi?n phí dành cho b?n. G?i s? [phone].         [facility] complies with applicable Federal civil rights laws and does not discriminate on the basis of race, color, national origin, age, disability, or sex.

## 2017-04-26 NOTE — PROGRESS NOTES
Subjective:       Patient ID: Catia Carranza is a 88 y.o. female.    Chief Complaint: Follow-up    HPI Comments: History of present illness: Patient comes in with her , here for follow-up of renal insufficiency and hypothyroidism.   made adjustments to fluid intake which helped and we adjusted her thyroid level and her TSH and free T4 are improved.  Overall the patient seems to be doing a little better as far as labs go but he says she is about the same.  She still has poor memory, chronic pain and anxiety.  No chest pain or shortness of breath.  She answers questions but only with yes or no and not necessarily appropriately.    Review of Systems   Constitutional: Negative for appetite change, chills and fever.   HENT: Negative for sore throat.    Respiratory: Negative for cough, shortness of breath and wheezing.    Cardiovascular: Negative for chest pain and leg swelling.   Gastrointestinal: Negative for abdominal pain, constipation and diarrhea.   Genitourinary: Negative for difficulty urinating.   Musculoskeletal: Positive for arthralgias. Negative for neck pain and neck stiffness.   Skin: Positive for wound (skin lesion right ear followed by dermatology). Negative for rash.   Psychiatric/Behavioral: Positive for confusion.       Objective:      Physical Exam   Constitutional: She is oriented to person, place, and time. She appears well-developed and well-nourished. No distress.   HENT:   Head: Normocephalic and atraumatic.   Mouth/Throat: Oropharynx is clear and moist. No oropharyngeal exudate.   Eyes: Conjunctivae are normal. Pupils are equal, round, and reactive to light. No scleral icterus.   Neck: Normal range of motion. Neck supple. No thyromegaly present.   Cardiovascular: Normal rate and regular rhythm.    No murmur heard.  Pulmonary/Chest: Effort normal and breath sounds normal. She has no wheezes.   Abdominal: Soft. Bowel sounds are normal. She exhibits no distension. There is no  tenderness.   Musculoskeletal: She exhibits tenderness (low back).   Lymphadenopathy:     She has no cervical adenopathy.   Neurological: She is alert and oriented to person, place, and time.   Skin: No rash noted.   Crusted lesion right ear followed by dermatology   Psychiatric: She has a normal mood and affect.       Assessment:       1. CKD (chronic kidney disease) stage 3, GFR 30-59 ml/min    2. Osteoarthritis of lumbar spine with myelopathy    3. Chronic pain syndrome    4. Alzheimer's dementia without behavioral disturbance, unspecified timing of dementia onset    5. Thyroid disease        Plan:       Catia was seen today for follow-up.    Diagnoses and all orders for this visit:    CKD (chronic kidney disease) stage 3, GFR 30-59 ml/min    Osteoarthritis of lumbar spine with myelopathy    Chronic pain syndrome    Alzheimer's dementia without behavioral disturbance, unspecified timing of dementia onset    Thyroid disease        follow-up in 2 months, sooner when necessary.  Continue to encourage hydration and continue meds but minimize pain meds when possible

## 2017-06-26 ENCOUNTER — OFFICE VISIT (OUTPATIENT)
Dept: INTERNAL MEDICINE | Facility: CLINIC | Age: 82
End: 2017-06-26
Payer: MEDICARE

## 2017-06-26 VITALS — HEART RATE: 72 BPM | SYSTOLIC BLOOD PRESSURE: 122 MMHG | DIASTOLIC BLOOD PRESSURE: 74 MMHG

## 2017-06-26 DIAGNOSIS — I10 ESSENTIAL HYPERTENSION: Chronic | ICD-10-CM

## 2017-06-26 DIAGNOSIS — I70.0 ATHEROSCLEROSIS OF AORTA: ICD-10-CM

## 2017-06-26 DIAGNOSIS — E78.5 HYPERLIPIDEMIA, UNSPECIFIED HYPERLIPIDEMIA TYPE: ICD-10-CM

## 2017-06-26 DIAGNOSIS — F11.90 CHRONIC, CONTINUOUS USE OF OPIOIDS: Primary | Chronic | ICD-10-CM

## 2017-06-26 DIAGNOSIS — M47.16 OSTEOARTHRITIS OF LUMBAR SPINE WITH MYELOPATHY: ICD-10-CM

## 2017-06-26 DIAGNOSIS — N18.30 CKD (CHRONIC KIDNEY DISEASE) STAGE 3, GFR 30-59 ML/MIN: ICD-10-CM

## 2017-06-26 DIAGNOSIS — G89.4 CHRONIC PAIN SYNDROME: ICD-10-CM

## 2017-06-26 PROCEDURE — 99499 UNLISTED E&M SERVICE: CPT | Mod: S$GLB,,, | Performed by: INTERNAL MEDICINE

## 2017-06-26 PROCEDURE — 99214 OFFICE O/P EST MOD 30 MIN: CPT | Mod: S$GLB,,, | Performed by: INTERNAL MEDICINE

## 2017-06-26 PROCEDURE — 1126F AMNT PAIN NOTED NONE PRSNT: CPT | Mod: S$GLB,,, | Performed by: INTERNAL MEDICINE

## 2017-06-26 PROCEDURE — 99999 PR PBB SHADOW E&M-EST. PATIENT-LVL III: CPT | Mod: PBBFAC,,, | Performed by: INTERNAL MEDICINE

## 2017-06-26 PROCEDURE — 1159F MED LIST DOCD IN RCRD: CPT | Mod: S$GLB,,, | Performed by: INTERNAL MEDICINE

## 2017-06-26 PROCEDURE — 1157F ADVNC CARE PLAN IN RCRD: CPT | Mod: S$GLB,,, | Performed by: INTERNAL MEDICINE

## 2017-06-26 RX ORDER — HYDROCODONE BITARTRATE AND ACETAMINOPHEN 5; 325 MG/1; MG/1
1 TABLET ORAL 3 TIMES DAILY
Qty: 270 TABLET | Refills: 0 | Status: SHIPPED | OUTPATIENT
Start: 2017-06-26 | End: 2017-09-28 | Stop reason: SDUPTHER

## 2017-06-26 NOTE — PROGRESS NOTES
Subjective:       Patient ID: Catia Carranza is a 88 y.o. female.    Chief Complaint: Medication Refill    History of present illness: Patient attended by her , here for interval follow-up of chronic pain and other medical problems.  She has severe degenerative spine disease and arthritis.  Chronic pain for which she has been fairly well controlled on hydrocodone.  She has not escalated use and her  tries to reduce use.  Sometimes she will complain about a pain but shortly thereafter it seems that she either forgot about it or past.  Some of this has been difficult to  based on her dementia and poor memory but he says he believes things are going fairly well.  The patient denies any chest pain shortness of breath fevers or chills.  Fair appetite.  No known are elicited side effects from her chronic medication.  Labs a few months ago were stable so I will not repeat those at this time.  We talked a and they have discussed needs around the home, possible assisted living but for now they feel the situation is stable and have not proceeded with this.      Medication Refill   Associated symptoms include arthralgias and joint swelling. Pertinent negatives include no abdominal pain, chest pain, chills, coughing, fever, neck pain, rash or sore throat.     Review of Systems   Constitutional: Negative for appetite change, chills and fever.   HENT: Negative for sore throat.    Respiratory: Negative for cough, shortness of breath and wheezing.    Cardiovascular: Negative for chest pain and leg swelling.   Gastrointestinal: Negative for abdominal pain, constipation and diarrhea.   Genitourinary: Negative for difficulty urinating.   Musculoskeletal: Positive for arthralgias, back pain, gait problem and joint swelling. Negative for neck pain and neck stiffness.   Skin: Negative for rash.   Neurological:        Very poor memory       Objective:      Physical Exam   Constitutional: She appears well-developed  and well-nourished. No distress.   HENT:   Head: Normocephalic and atraumatic.   Mouth/Throat: Oropharynx is clear and moist. No oropharyngeal exudate.   Eyes: Conjunctivae are normal. Pupils are equal, round, and reactive to light. No scleral icterus.   Neck: Normal range of motion. Neck supple. No thyromegaly present.   Cardiovascular: Normal rate and regular rhythm.    No murmur heard.  Pulmonary/Chest: Effort normal and breath sounds normal. She has no wheezes.   Abdominal: Soft. Bowel sounds are normal. She exhibits no distension. There is no tenderness.   Musculoskeletal: She exhibits tenderness (Stiff and tender back) and deformity (Large swollen knees. ).   Lymphadenopathy:     She has no cervical adenopathy.   Skin: No rash noted.   Psychiatric: She has a normal mood and affect.       Assessment:       1. Chronic, continuous use of opioids    2. Chronic pain syndrome    3. Osteoarthritis of lumbar spine with myelopathy    4. Atherosclerosis of aorta    5. Essential hypertension    6. CKD (chronic kidney disease) stage 3, GFR 30-59 ml/min    7. Hyperlipidemia, unspecified hyperlipidemia type        Plan:       Catia was seen today for medication refill.    Diagnoses and all orders for this visit:    Chronic, continuous use of opioids    Chronic pain syndrome    Osteoarthritis of lumbar spine with myelopathy    Atherosclerosis of aorta    Essential hypertension    CKD (chronic kidney disease) stage 3, GFR 30-59 ml/min    Hyperlipidemia, unspecified hyperlipidemia type    Other orders  -     hydrocodone-acetaminophen 5-325mg (NORCO) 5-325 mg per tablet; Take 1 tablet by mouth 3 (three) times daily.

## 2017-09-28 ENCOUNTER — IMMUNIZATION (OUTPATIENT)
Dept: INTERNAL MEDICINE | Facility: CLINIC | Age: 82
End: 2017-09-28
Payer: MEDICARE

## 2017-09-28 ENCOUNTER — OFFICE VISIT (OUTPATIENT)
Dept: INTERNAL MEDICINE | Facility: CLINIC | Age: 82
End: 2017-09-28
Payer: MEDICARE

## 2017-09-28 VITALS
HEART RATE: 85 BPM | BODY MASS INDEX: 36.58 KG/M2 | DIASTOLIC BLOOD PRESSURE: 68 MMHG | WEIGHT: 200 LBS | OXYGEN SATURATION: 97 % | SYSTOLIC BLOOD PRESSURE: 114 MMHG

## 2017-09-28 DIAGNOSIS — F02.80 ALZHEIMER'S DEMENTIA WITHOUT BEHAVIORAL DISTURBANCE, UNSPECIFIED TIMING OF DEMENTIA ONSET: ICD-10-CM

## 2017-09-28 DIAGNOSIS — N18.30 CKD (CHRONIC KIDNEY DISEASE) STAGE 3, GFR 30-59 ML/MIN: ICD-10-CM

## 2017-09-28 DIAGNOSIS — R62.7 FTT (FAILURE TO THRIVE) IN ADULT: ICD-10-CM

## 2017-09-28 DIAGNOSIS — I10 ESSENTIAL HYPERTENSION: Chronic | ICD-10-CM

## 2017-09-28 DIAGNOSIS — G30.9 ALZHEIMER'S DEMENTIA WITHOUT BEHAVIORAL DISTURBANCE, UNSPECIFIED TIMING OF DEMENTIA ONSET: ICD-10-CM

## 2017-09-28 DIAGNOSIS — M15.9 PRIMARY OSTEOARTHRITIS INVOLVING MULTIPLE JOINTS: ICD-10-CM

## 2017-09-28 DIAGNOSIS — M48.061 LUMBAR STENOSIS: ICD-10-CM

## 2017-09-28 DIAGNOSIS — F11.90 CHRONIC, CONTINUOUS USE OF OPIOIDS: ICD-10-CM

## 2017-09-28 DIAGNOSIS — R53.81 PHYSICAL DECONDITIONING: Primary | ICD-10-CM

## 2017-09-28 DIAGNOSIS — R53.1 WEAKNESS GENERALIZED: Chronic | ICD-10-CM

## 2017-09-28 DIAGNOSIS — E07.9 THYROID DISEASE: ICD-10-CM

## 2017-09-28 DIAGNOSIS — G89.4 CHRONIC PAIN SYNDROME: ICD-10-CM

## 2017-09-28 PROCEDURE — 90662 IIV NO PRSV INCREASED AG IM: CPT | Mod: S$GLB,,, | Performed by: INTERNAL MEDICINE

## 2017-09-28 PROCEDURE — 1159F MED LIST DOCD IN RCRD: CPT | Mod: S$GLB,,, | Performed by: INTERNAL MEDICINE

## 2017-09-28 PROCEDURE — 3008F BODY MASS INDEX DOCD: CPT | Mod: S$GLB,,, | Performed by: INTERNAL MEDICINE

## 2017-09-28 PROCEDURE — G0008 ADMIN INFLUENZA VIRUS VAC: HCPCS | Mod: S$GLB,,, | Performed by: INTERNAL MEDICINE

## 2017-09-28 PROCEDURE — 1157F ADVNC CARE PLAN IN RCRD: CPT | Mod: S$GLB,,, | Performed by: INTERNAL MEDICINE

## 2017-09-28 PROCEDURE — 1125F AMNT PAIN NOTED PAIN PRSNT: CPT | Mod: S$GLB,,, | Performed by: INTERNAL MEDICINE

## 2017-09-28 PROCEDURE — 99499 UNLISTED E&M SERVICE: CPT | Mod: S$GLB,,, | Performed by: INTERNAL MEDICINE

## 2017-09-28 PROCEDURE — 99999 PR PBB SHADOW E&M-EST. PATIENT-LVL III: CPT | Mod: PBBFAC,,, | Performed by: INTERNAL MEDICINE

## 2017-09-28 PROCEDURE — 99214 OFFICE O/P EST MOD 30 MIN: CPT | Mod: S$GLB,,, | Performed by: INTERNAL MEDICINE

## 2017-09-28 RX ORDER — HYDROCODONE BITARTRATE AND ACETAMINOPHEN 5; 325 MG/1; MG/1
1 TABLET ORAL 3 TIMES DAILY
Qty: 270 TABLET | Refills: 0 | Status: SHIPPED | OUTPATIENT
Start: 2017-09-28 | End: 2017-12-11 | Stop reason: SDUPTHER

## 2017-09-28 NOTE — PROGRESS NOTES
Subjective:       Patient ID: Catia Carranza is a 88 y.o. female.    Chief Complaint: Follow-up (refill norco)    Patient attended by her  here with numerous medical conditions primarily here for follow-up of hydrocodone refill.  Patient has debilitating chronic pain control by hydrocodone 2-3 times daily.  The  tells me they do have some help at the house and a  comes once a week to check in and assist with needs.  He says that's actually going better than he expected.  She denies any chest pain shortness of breath fevers or chills.  No cough or wheeze.  She just complains of knee and back pain.  Other labs are up-to-date and I will not repeat those now.  She would like to get a flu shot.  Short-term memory is poor.  She repeat her self multiple times during the visit but he successfully redirects her.      Review of Systems   Constitutional: Positive for activity change. Negative for appetite change, chills and fever.   HENT: Negative for sore throat.    Respiratory: Negative for cough, shortness of breath and wheezing.    Cardiovascular: Negative for chest pain and leg swelling.   Gastrointestinal: Negative for abdominal pain, constipation and diarrhea.   Genitourinary: Negative for difficulty urinating.   Musculoskeletal: Positive for arthralgias, back pain and gait problem. Negative for neck pain and neck stiffness.   Skin: Negative for rash.   Psychiatric/Behavioral: Positive for confusion.       Objective:      Physical Exam   Constitutional: She is oriented to person, place, and time. She appears well-developed and well-nourished. No distress.   HENT:   Head: Normocephalic and atraumatic.   Mouth/Throat: Oropharynx is clear and moist. No oropharyngeal exudate.   Eyes: Conjunctivae are normal. Pupils are equal, round, and reactive to light. No scleral icterus.   Neck: Normal range of motion. Neck supple. No thyromegaly present.   Cardiovascular: Normal rate and regular rhythm.     No murmur heard.  Pulmonary/Chest: Effort normal and breath sounds normal. She has no wheezes.   Abdominal: Soft. Bowel sounds are normal. She exhibits no distension. There is no tenderness.   Musculoskeletal: She exhibits tenderness (ypertrophied knees with stiffness and tenderness).   Lymphadenopathy:     She has no cervical adenopathy.   Neurological: She is alert and oriented to person, place, and time.   Skin: No rash noted.   Skin lesion on the right earlobe has significantly improved   Psychiatric: She has a normal mood and affect.       Assessment:       1. Physical deconditioning    2. Weakness generalized    3. FTT (failure to thrive) in adult    4. Primary osteoarthritis involving multiple joints    5. CKD (chronic kidney disease) stage 3, GFR 30-59 ml/min    6. Essential hypertension    7. Alzheimer's dementia without behavioral disturbance, unspecified timing of dementia onset    8. Chronic pain syndrome    9. Thyroid disease    10. Obesity, Class II, BMI 35-39.9, with comorbidity    11. Lumbar stenosis    12. Chronic, continuous use of opioids        Plan:       Catia was seen today for follow-up.    Diagnoses and all orders for this visit:    Physical deconditioning    Weakness generalized    FTT (failure to thrive) in adult    Primary osteoarthritis involving multiple joints    CKD (chronic kidney disease) stage 3, GFR 30-59 ml/min    Essential hypertension    Alzheimer's dementia without behavioral disturbance, unspecified timing of dementia onset    Chronic pain syndrome    Thyroid disease    Obesity, Class II, BMI 35-39.9, with comorbidity    Lumbar stenosis    Chronic, continuous use of opioids    Other orders  -     hydrocodone-acetaminophen 5-325mg (NORCO) 5-325 mg per tablet; Take 1 tablet by mouth 3 (three) times daily.        Follow-up in 3 months

## 2017-10-09 ENCOUNTER — PATIENT MESSAGE (OUTPATIENT)
Dept: INTERNAL MEDICINE | Facility: CLINIC | Age: 82
End: 2017-10-09

## 2017-10-09 RX ORDER — METOPROLOL SUCCINATE 25 MG/1
25 TABLET, EXTENDED RELEASE ORAL DAILY
Qty: 90 TABLET | Refills: 11 | Status: SHIPPED | OUTPATIENT
Start: 2017-10-09 | End: 2017-12-11 | Stop reason: SDUPTHER

## 2017-12-11 ENCOUNTER — OFFICE VISIT (OUTPATIENT)
Dept: INTERNAL MEDICINE | Facility: CLINIC | Age: 82
End: 2017-12-11
Payer: MEDICARE

## 2017-12-11 VITALS
WEIGHT: 200 LBS | HEIGHT: 62 IN | SYSTOLIC BLOOD PRESSURE: 136 MMHG | HEART RATE: 52 BPM | DIASTOLIC BLOOD PRESSURE: 60 MMHG | BODY MASS INDEX: 36.8 KG/M2

## 2017-12-11 DIAGNOSIS — F02.80 DEMENTIA ASSOCIATED WITH OTHER UNDERLYING DISEASE WITHOUT BEHAVIORAL DISTURBANCE: ICD-10-CM

## 2017-12-11 DIAGNOSIS — R53.81 PHYSICAL DECONDITIONING: ICD-10-CM

## 2017-12-11 DIAGNOSIS — G89.29 CHRONIC PAIN OF LEFT KNEE: ICD-10-CM

## 2017-12-11 DIAGNOSIS — N18.30 CKD (CHRONIC KIDNEY DISEASE) STAGE 3, GFR 30-59 ML/MIN: ICD-10-CM

## 2017-12-11 DIAGNOSIS — E07.9 THYROID DISEASE: Primary | ICD-10-CM

## 2017-12-11 DIAGNOSIS — J40 BRONCHITIS: ICD-10-CM

## 2017-12-11 DIAGNOSIS — K59.09 CHRONIC CONSTIPATION: ICD-10-CM

## 2017-12-11 DIAGNOSIS — M25.562 CHRONIC PAIN OF LEFT KNEE: ICD-10-CM

## 2017-12-11 DIAGNOSIS — E55.9 VITAMIN D DEFICIENCY DISEASE: Chronic | ICD-10-CM

## 2017-12-11 DIAGNOSIS — E66.01 SEVERE OBESITY WITH BODY MASS INDEX (BMI) OF 36.0 TO 36.9: ICD-10-CM

## 2017-12-11 DIAGNOSIS — I77.819 ECTATIC AORTA: ICD-10-CM

## 2017-12-11 PROCEDURE — 99999 PR PBB SHADOW E&M-EST. PATIENT-LVL III: CPT | Mod: PBBFAC,,, | Performed by: INTERNAL MEDICINE

## 2017-12-11 PROCEDURE — 99214 OFFICE O/P EST MOD 30 MIN: CPT | Mod: S$GLB,,, | Performed by: INTERNAL MEDICINE

## 2017-12-11 PROCEDURE — 99499 UNLISTED E&M SERVICE: CPT | Mod: S$GLB,,, | Performed by: INTERNAL MEDICINE

## 2017-12-11 RX ORDER — METOPROLOL SUCCINATE 25 MG/1
25 TABLET, EXTENDED RELEASE ORAL DAILY
Qty: 90 TABLET | Refills: 11 | Status: SHIPPED | OUTPATIENT
Start: 2017-12-11 | End: 2018-12-11

## 2017-12-11 RX ORDER — HYDROCODONE BITARTRATE AND ACETAMINOPHEN 5; 325 MG/1; MG/1
1 TABLET ORAL 3 TIMES DAILY
Qty: 270 TABLET | Refills: 0 | Status: SHIPPED | OUTPATIENT
Start: 2017-12-11 | End: 2018-03-12 | Stop reason: SDUPTHER

## 2017-12-11 NOTE — PROGRESS NOTES
Subjective:       Patient ID: Catia Carranza is a 89 y.o. female.    Chief Complaint: Follow-up     Patient with numerous medical problems including mild dementia, lumbar spondylosis, osteoarthritis of the lumbar spine, arthritis in the knees, hypothyroidism comes in for follow-up and refill of pain medicine.  I discussed this at length with the patient and his wife she will complete the pain contract.  They have help in the house 3-4 days a week.  Functionally this keeps her going although the  admits they are both getting older.  She has not fallen.  She presently says her pain is controlled.  She had a flu shot.  We will do labs next visit      Review of Systems   Constitutional: Negative for appetite change, chills and fever.   HENT: Negative for nosebleeds, sinus pain and sore throat.    Eyes: Negative for visual disturbance.   Respiratory: Negative for cough, shortness of breath and wheezing.    Cardiovascular: Negative for chest pain and leg swelling.   Gastrointestinal: Negative for abdominal pain, constipation and diarrhea.   Genitourinary: Negative for difficulty urinating and hematuria.   Musculoskeletal: Positive for arthralgias, back pain, gait problem and joint swelling. Negative for neck pain and neck stiffness.   Skin: Negative for pallor and rash.   Neurological: Positive for weakness. Negative for headaches.   Psychiatric/Behavioral: Positive for confusion. Negative for dysphoric mood and suicidal ideas. The patient is not nervous/anxious.        Objective:      Physical Exam   Constitutional: She is oriented to person, place, and time. She appears well-developed and well-nourished. No distress.   Obese female, seated in wheelchair   HENT:   Head: Normocephalic and atraumatic.   Right Ear: External ear normal.   Left Ear: External ear normal.   Mouth/Throat: Oropharynx is clear and moist. No oropharyngeal exudate.   Eyes: Conjunctivae are normal. Pupils are equal, round, and reactive to  light. No scleral icterus.   Neck: Normal range of motion. Neck supple. No thyromegaly present.   Cardiovascular: Normal rate and regular rhythm.    No murmur heard.  Pulmonary/Chest: Effort normal and breath sounds normal. She has no wheezes.   Abdominal: Soft. Bowel sounds are normal. She exhibits no distension. There is no tenderness.   Musculoskeletal: She exhibits tenderness ( low back, knees) and deformity ( bony hypertrophy of the knees). She exhibits no edema.   Lymphadenopathy:     She has no cervical adenopathy.   Neurological: She is alert and oriented to person, place, and time.   Skin: No rash noted.   Psychiatric: She has a normal mood and affect.       Assessment:       1. Thyroid disease    2. Severe obesity with body mass index (BMI) of 36.0 to 36.9    3. Chronic pain of left knee    4. Chronic constipation    5. Vitamin D deficiency disease    6. CKD (chronic kidney disease) stage 3, GFR 30-59 ml/min    7. Physical deconditioning    8. Dementia associated with other underlying disease without behavioral disturbance    9. Bronchitis    10. Ectatic aorta        Plan:       Catia was seen today for follow-up.    Diagnoses and all orders for this visit:    Thyroid disease  -     TSH; Future  -     Comprehensive metabolic panel; Future  -     Lipid panel; Future    Severe obesity with body mass index (BMI) of 36.0 to 36.9  -     TSH; Future  -     Comprehensive metabolic panel; Future  -     Lipid panel; Future    Chronic pain of left knee  -     TSH; Future  -     Comprehensive metabolic panel; Future  -     Lipid panel; Future    Chronic constipation  -     TSH; Future  -     Comprehensive metabolic panel; Future  -     Lipid panel; Future    Vitamin D deficiency disease  -     TSH; Future  -     Comprehensive metabolic panel; Future  -     Lipid panel; Future  -     Vitamin D; Future    CKD (chronic kidney disease) stage 3, GFR 30-59 ml/min  -     TSH; Future  -     Comprehensive metabolic panel;  Future  -     Lipid panel; Future    Physical deconditioning  -     TSH; Future  -     Comprehensive metabolic panel; Future  -     Lipid panel; Future    Dementia associated 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  -     metoprolol succinate (TOPROL-XL) 25 MG 24 hr tablet; Take 1 tablet (25 mg total) by mouth once daily.         Follow-up in a few months with lab.  Pain contract  completed

## 2018-01-04 ENCOUNTER — PES CALL (OUTPATIENT)
Dept: ADMINISTRATIVE | Facility: CLINIC | Age: 83
End: 2018-01-04

## 2018-01-15 RX ORDER — SIMVASTATIN 40 MG/1
TABLET, FILM COATED ORAL
Qty: 90 TABLET | Refills: 3 | Status: SHIPPED | OUTPATIENT
Start: 2018-01-15

## 2018-01-22 ENCOUNTER — PES CALL (OUTPATIENT)
Dept: ADMINISTRATIVE | Facility: CLINIC | Age: 83
End: 2018-01-22

## 2018-02-07 ENCOUNTER — TELEPHONE (OUTPATIENT)
Dept: INTERNAL MEDICINE | Facility: CLINIC | Age: 83
End: 2018-02-07

## 2018-02-07 NOTE — TELEPHONE ENCOUNTER
Please see note below. Can I book abeba in 2:30 hospital follow up slot so their appointments are together

## 2018-02-07 NOTE — TELEPHONE ENCOUNTER
----- Message from Tomás Burton sent at 2/7/2018  1:08 PM CST -----  Contact: Cristian//[phone]  Pt's  is calling to speak with someone in the office.  states that pt has an apt on 3/12/2018 at 2:00. Pt has dementia and her  has to be there with her. He also has an apt the same day at 3:30 with the same doctor. He would like to know if it is possible to do their apts together. Please call and advise.    Thank You

## 2018-03-05 ENCOUNTER — LAB VISIT (OUTPATIENT)
Dept: LAB | Facility: HOSPITAL | Age: 83
End: 2018-03-05
Attending: INTERNAL MEDICINE
Payer: MEDICARE

## 2018-03-05 DIAGNOSIS — E07.9 THYROID DISEASE: ICD-10-CM

## 2018-03-05 DIAGNOSIS — R53.81 PHYSICAL DECONDITIONING: ICD-10-CM

## 2018-03-05 DIAGNOSIS — G89.29 CHRONIC PAIN OF LEFT KNEE: ICD-10-CM

## 2018-03-05 DIAGNOSIS — E66.01 SEVERE OBESITY WITH BODY MASS INDEX (BMI) OF 36.0 TO 36.9: ICD-10-CM

## 2018-03-05 DIAGNOSIS — N18.30 CKD (CHRONIC KIDNEY DISEASE) STAGE 3, GFR 30-59 ML/MIN: ICD-10-CM

## 2018-03-05 DIAGNOSIS — I77.819 ECTATIC AORTA: ICD-10-CM

## 2018-03-05 DIAGNOSIS — M25.562 CHRONIC PAIN OF LEFT KNEE: ICD-10-CM

## 2018-03-05 DIAGNOSIS — J40 BRONCHITIS: ICD-10-CM

## 2018-03-05 DIAGNOSIS — E55.9 VITAMIN D DEFICIENCY DISEASE: Chronic | ICD-10-CM

## 2018-03-05 DIAGNOSIS — K59.09 CHRONIC CONSTIPATION: ICD-10-CM

## 2018-03-05 DIAGNOSIS — F02.80 DEMENTIA ASSOCIATED WITH OTHER UNDERLYING DISEASE WITHOUT BEHAVIORAL DISTURBANCE: ICD-10-CM

## 2018-03-05 LAB
25(OH)D3+25(OH)D2 SERPL-MCNC: 52 NG/ML
ALBUMIN SERPL BCP-MCNC: 3.4 G/DL
ALP SERPL-CCNC: 119 U/L
ALT SERPL W/O P-5'-P-CCNC: 15 U/L
ANION GAP SERPL CALC-SCNC: 10 MMOL/L
AST SERPL-CCNC: 23 U/L
BILIRUB SERPL-MCNC: 0.4 MG/DL
BUN SERPL-MCNC: 32 MG/DL
CALCIUM SERPL-MCNC: 9.3 MG/DL
CHLORIDE SERPL-SCNC: 107 MMOL/L
CHOLEST SERPL-MCNC: 170 MG/DL
CHOLEST/HDLC SERPL: 3.1 {RATIO}
CO2 SERPL-SCNC: 23 MMOL/L
CREAT SERPL-MCNC: 1.3 MG/DL
EST. GFR  (AFRICAN AMERICAN): 42 ML/MIN/1.73 M^2
EST. GFR  (NON AFRICAN AMERICAN): 36 ML/MIN/1.73 M^2
GLUCOSE SERPL-MCNC: 104 MG/DL
HDLC SERPL-MCNC: 55 MG/DL
HDLC SERPL: 32.4 %
LDLC SERPL CALC-MCNC: 89.4 MG/DL
NONHDLC SERPL-MCNC: 115 MG/DL
POTASSIUM SERPL-SCNC: 5.2 MMOL/L
PROT SERPL-MCNC: 6.6 G/DL
SODIUM SERPL-SCNC: 140 MMOL/L
T4 FREE SERPL-MCNC: 1.52 NG/DL
TRIGL SERPL-MCNC: 128 MG/DL
TSH SERPL DL<=0.005 MIU/L-ACNC: <0.01 UIU/ML

## 2018-03-05 PROCEDURE — 82306 VITAMIN D 25 HYDROXY: CPT

## 2018-03-05 PROCEDURE — 36415 COLL VENOUS BLD VENIPUNCTURE: CPT

## 2018-03-05 PROCEDURE — 80061 LIPID PANEL: CPT

## 2018-03-05 PROCEDURE — 84443 ASSAY THYROID STIM HORMONE: CPT

## 2018-03-05 PROCEDURE — 80053 COMPREHEN METABOLIC PANEL: CPT

## 2018-03-05 PROCEDURE — 84439 ASSAY OF FREE THYROXINE: CPT

## 2018-03-07 RX ORDER — LEVOTHYROXINE SODIUM 100 UG/1
100 TABLET ORAL DAILY
Qty: 90 TABLET | Refills: 4 | Status: SHIPPED | OUTPATIENT
Start: 2018-03-07

## 2018-03-07 NOTE — TELEPHONE ENCOUNTER
Spoke to pt  and he states he saw his wife's blood test result and it shows her thyroid level is way too low. He states this has happened in the past and you considered it an emergency. He would like to know if this is an emergency situation.

## 2018-03-07 NOTE — TELEPHONE ENCOUNTER
Informed pt  that this is not an emergency and this is an over corrected thyroid. He wants rx sent to [facility]

## 2018-03-07 NOTE — TELEPHONE ENCOUNTER
So it is actually an OVER corrected, not undercorrected thyroid. The dose needs to be decreased. Do they want me to send it locally or to [facility]. It is not generally an emergency. We just need to adjust the dose. Let me know where to send it

## 2018-03-07 NOTE — TELEPHONE ENCOUNTER
----- Message from Meghana Sandoval sent at 3/7/2018  1:34 PM CST -----  Contact: Cristian roberth   Patient's  is calling because he saw the results on mychart and is concerned with one of them, please call him back to discuss all results.    Thank you

## 2018-03-12 ENCOUNTER — OFFICE VISIT (OUTPATIENT)
Dept: INTERNAL MEDICINE | Facility: CLINIC | Age: 83
End: 2018-03-12
Payer: MEDICARE

## 2018-03-12 VITALS — HEIGHT: 62 IN | SYSTOLIC BLOOD PRESSURE: 110 MMHG | DIASTOLIC BLOOD PRESSURE: 70 MMHG | HEART RATE: 78 BPM

## 2018-03-12 DIAGNOSIS — F02.80 DEMENTIA ASSOCIATED WITH OTHER UNDERLYING DISEASE WITHOUT BEHAVIORAL DISTURBANCE: ICD-10-CM

## 2018-03-12 DIAGNOSIS — E07.9 THYROID DISEASE: ICD-10-CM

## 2018-03-12 DIAGNOSIS — I70.0 ATHEROSCLEROSIS OF AORTA: ICD-10-CM

## 2018-03-12 DIAGNOSIS — N18.30 CKD (CHRONIC KIDNEY DISEASE) STAGE 3, GFR 30-59 ML/MIN: ICD-10-CM

## 2018-03-12 DIAGNOSIS — F41.9 ANXIETY: ICD-10-CM

## 2018-03-12 DIAGNOSIS — E78.5 HYPERLIPIDEMIA, UNSPECIFIED HYPERLIPIDEMIA TYPE: ICD-10-CM

## 2018-03-12 DIAGNOSIS — F02.80 ALZHEIMER'S DEMENTIA WITHOUT BEHAVIORAL DISTURBANCE, UNSPECIFIED TIMING OF DEMENTIA ONSET: ICD-10-CM

## 2018-03-12 DIAGNOSIS — G30.9 ALZHEIMER'S DEMENTIA WITHOUT BEHAVIORAL DISTURBANCE, UNSPECIFIED TIMING OF DEMENTIA ONSET: ICD-10-CM

## 2018-03-12 DIAGNOSIS — R53.81 PHYSICAL DECONDITIONING: ICD-10-CM

## 2018-03-12 DIAGNOSIS — M15.9 PRIMARY OSTEOARTHRITIS INVOLVING MULTIPLE JOINTS: ICD-10-CM

## 2018-03-12 DIAGNOSIS — M48.061 SPINAL STENOSIS OF LUMBAR REGION, UNSPECIFIED WHETHER NEUROGENIC CLAUDICATION PRESENT: ICD-10-CM

## 2018-03-12 DIAGNOSIS — M47.16 OSTEOARTHRITIS OF LUMBAR SPINE WITH MYELOPATHY: Primary | ICD-10-CM

## 2018-03-12 DIAGNOSIS — I77.819 ECTATIC AORTA: ICD-10-CM

## 2018-03-12 DIAGNOSIS — F11.90 CHRONIC, CONTINUOUS USE OF OPIOIDS: Chronic | ICD-10-CM

## 2018-03-12 PROCEDURE — 99999 PR PBB SHADOW E&M-EST. PATIENT-LVL III: CPT | Mod: PBBFAC,,, | Performed by: INTERNAL MEDICINE

## 2018-03-12 PROCEDURE — 99214 OFFICE O/P EST MOD 30 MIN: CPT | Mod: S$GLB,,, | Performed by: INTERNAL MEDICINE

## 2018-03-12 PROCEDURE — 99499 UNLISTED E&M SERVICE: CPT | Mod: S$GLB,,, | Performed by: INTERNAL MEDICINE

## 2018-03-12 RX ORDER — GABAPENTIN 100 MG/1
100 CAPSULE ORAL 2 TIMES DAILY
Qty: 180 CAPSULE | Refills: 3 | Status: SHIPPED | OUTPATIENT
Start: 2018-03-12 | End: 2019-03-12

## 2018-03-12 RX ORDER — HYDROCODONE BITARTRATE AND ACETAMINOPHEN 5; 325 MG/1; MG/1
1 TABLET ORAL 3 TIMES DAILY
Qty: 270 TABLET | Refills: 0 | Status: SHIPPED | OUTPATIENT
Start: 2018-03-12 | End: 2018-03-15 | Stop reason: SDUPTHER

## 2018-03-12 RX ORDER — LISINOPRIL 20 MG/1
20 TABLET ORAL DAILY
Qty: 90 TABLET | Refills: 3 | Status: SHIPPED | OUTPATIENT
Start: 2018-03-12 | End: 2019-03-12

## 2018-03-12 RX ORDER — MEMANTINE HYDROCHLORIDE 10 MG/1
10 TABLET ORAL 2 TIMES DAILY
Qty: 180 TABLET | Refills: 3 | Status: SHIPPED | OUTPATIENT
Start: 2018-03-12 | End: 2019-03-12

## 2018-03-12 NOTE — PROGRESS NOTES
Subjective:       Patient ID: Catia Carranza is a 89 y.o. female.    Chief Complaint: Follow-up (4 month)    89-year-old here with her .  She has mild dementia although is able to listen and participate and follow commands with our visit.  She denies any acute complaints other than back and leg pain.  She is primarily cared for by her  although they do have a caretaker 4-5 days a week for 3 hr to helps with bathing her and assist with food and housekeeping.  There is a  that comes once a month as well.  We talked at length with the  about planning in regards to if something would happen to him who would care for her.  Their kids are all out of town as well as grandkids.  Said he has thought about it but not pursued her acted on it he will discuss it with the  the next time she comes.  Talked about the importance of monitoring medication and labs.  We had to reduce the Synthroid dose because her TSH was low and free T4 elevated.  We will reassess the lab next visit.  Other labs were stable.  Will refill her pain medication.  It reduces the amount of pain she experiences and  believes she has more quality of life when using it.  He manages it carefully      Review of Systems   Constitutional: Negative for appetite change, chills and fever.   HENT: Negative for nosebleeds, sinus pain and sore throat.    Eyes: Negative for visual disturbance.   Respiratory: Negative for cough, shortness of breath and wheezing.    Cardiovascular: Negative for chest pain and leg swelling.   Gastrointestinal: Negative for abdominal pain, constipation and diarrhea.   Genitourinary: Negative for difficulty urinating and hematuria.   Musculoskeletal: Positive for arthralgias (lower extremities), back pain, gait problem and joint swelling ( knees). Negative for neck pain and neck stiffness.   Skin: Negative for pallor and rash.   Neurological: Negative for headaches.         Intermittent memory impairment   Psychiatric/Behavioral: Negative for dysphoric mood and suicidal ideas. The patient is not nervous/anxious.        Objective:      Physical Exam   Constitutional: She is oriented to person, place, and time. She appears well-developed and well-nourished. No distress.   Overweight female no acute distress, follows commands, answers questions   HENT:   Head: Normocephalic and atraumatic.   Right Ear: External ear normal.   Left Ear: External ear normal.   Mouth/Throat: Oropharynx is clear and moist. No oropharyngeal exudate.   Eyes: Conjunctivae are normal. Pupils are equal, round, and reactive to light. No scleral icterus.   Neck: Normal range of motion. Neck supple. No thyromegaly present.   Cardiovascular: Normal rate and regular rhythm.    No murmur heard.  Pulmonary/Chest: Effort normal and breath sounds normal. She has no wheezes.   Abdominal: Soft. Bowel sounds are normal. She exhibits no distension. There is no tenderness.   Musculoskeletal: She exhibits tenderness and deformity ( bilat knee hypertrophy).   Lymphadenopathy:     She has no cervical adenopathy.   Neurological: She is alert and oriented to person, place, and time.   Skin: No rash noted.   Psychiatric: She has a normal mood and affect. Her behavior is normal.   Vitals reviewed.      Assessment:       1. Osteoarthritis of lumbar spine with myelopathy    2. Alzheimer's dementia without behavioral disturbance, unspecified timing of dementia onset    3. 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 Spinal stenosis of lumbar region, unspecified whether neurogenic claudication present    5. Chronic, continuous use of opioids    6. Anxiety    7. Hyperlipidemia, unspecified hyperlipidemia type    8. Atherosclerosis of aorta    9. Ectatic aorta    10. CKD (chronic kidney disease) stage 3, GFR 30-59 ml/min    11. Thyroid disease    12. Primary osteoarthritis involving multiple joints     13. Obesity, Class II, BMI 35-39.9, with comorbidity    14. Physical deconditioning        Plan:       Catia was seen today for follow-up.    Diagnoses and all orders for this visit:    Osteoarthritis of lumbar spine with myelopathy    Alzheimer's dementia without behavioral disturbance, unspecified timing of dementia onset  -     memantine (NAMENDA) 10 MG Tab; Take 1 tablet (10 mg total) by mouth 2 (two) times daily.    Dementia associated with other underlying disease without behavioral disturbance    Spinal stenosis of lumbar region, unspecified whether neurogenic claudication present  Comments:  Continue to monitor symptoms and medication use.     Chronic, continuous use of opioids    Anxiety    Hyperlipidemia, unspecified hyperlipidemia type    Atherosclerosis of aorta    Ectatic aorta  Comments:  Monitor labs and condition, vitals. Continue to work on diet.     CKD (chronic kidney disease) stage 3, GFR 30-59 ml/min    Thyroid disease    Primary osteoarthritis involving multiple joints    Obesity, Class II, BMI 35-39.9, with comorbidity  Comments:  Monitor labs and condition, vitals. Continue to work on diet.     Physical deconditioning    Other orders  -     lisinopril (PRINIVIL,ZESTRIL) 20 MG tablet; Take 1 tablet (20 mg total) by mouth once daily.  -     gabapentin (NEURONTIN) 100 MG capsule; Take 1 capsule (100 mg total) by mouth 2 (two) times daily.  -     hydrocodone-acetaminophen 5-325mg (NORCO) 5-325 mg per tablet; Take 1 tablet by mouth 3 (three) times daily.

## 2018-03-15 ENCOUNTER — TELEPHONE (OUTPATIENT)
Dept: INTERNAL MEDICINE | Facility: CLINIC | Age: 83
End: 2018-03-15

## 2018-03-15 RX ORDER — HYDROCODONE BITARTRATE AND ACETAMINOPHEN 5; 325 MG/1; MG/1
1 TABLET ORAL 3 TIMES DAILY
Qty: 90 TABLET | Refills: 0 | Status: SHIPPED | OUTPATIENT
Start: 2018-03-15

## 2018-03-15 NOTE — TELEPHONE ENCOUNTER
----- Message from Ashley Kearney sent at 3/15/2018  9:27 AM CDT -----  Contact: pharmacy/iván/[phone] ext [phone]  Pharmacy called in regards to the pt Rx for hydrocodone-acetaminophen 5-325mg (NORCO) 5-325 mg per tablet needs to be a 30 day. That's what the insurance will cover. She can get a verbal or a new Rx.      [facility]  Please advise

## 2018-03-22 ENCOUNTER — PES CALL (OUTPATIENT)
Dept: ADMINISTRATIVE | Facility: CLINIC | Age: 83
End: 2018-03-22

## 2018-03-27 ENCOUNTER — TELEPHONE (OUTPATIENT)
Dept: INTERNAL MEDICINE | Facility: CLINIC | Age: 83
End: 2018-03-27

## 2018-03-27 DIAGNOSIS — F02.81 ALZHEIMER'S DEMENTIA WITH BEHAVIORAL DISTURBANCE, UNSPECIFIED TIMING OF DEMENTIA ONSET: Primary | ICD-10-CM

## 2018-03-27 DIAGNOSIS — G30.9 ALZHEIMER'S DEMENTIA WITH BEHAVIORAL DISTURBANCE, UNSPECIFIED TIMING OF DEMENTIA ONSET: Primary | ICD-10-CM

## 2018-03-27 NOTE — TELEPHONE ENCOUNTER
----- Message from Loida Mahmood sent at 3/26/2018 10:33 AM CDT -----  Contact: / Cristian/ [phone]  Patient would like orders for hospice care, please call and advise. Patient would like to use [facility] fax # [phone].  would like a call back from Dr. Dinh.

## 2018-03-27 NOTE — TELEPHONE ENCOUNTER
Catia is not doing well. She is unintelligible part of the time. She has not been sleeping well and thrashes around during the night. I have caregivers five mornings a week and had multiple caregivers last weekend, both during the day and during the evening.   She does not take medicine willingly and I have noticed that she drinks a lot of fluids, water and orange juice.   I will continue with feeding, mostly protein drink, apples and grapes, and give her medicine.

## 2018-03-28 NOTE — TELEPHONE ENCOUNTER
"----- Message from Kadeem March sent at 3/28/2018 12:26 PM CDT -----  Contact: Care Manager Julia [phone]  Calling to have you send a Order to Hospice....."[facility]", Care Manager states if you have any questions regarding what is going on in the home will be gladly to answer them.    Please call and advise  Thank you  "

## 2018-03-28 NOTE — TELEPHONE ENCOUNTER
"Spoke to pt  Julia and she states the pt's  is requesting Hospice for the pt.  Pt has become combative. She is refusing to eat or take medication. She fell out of bed recently. The fire department came, but pt  decided not to take her to hospital. Her awareness and speech has changed. Pt is having trouble getting words out. Pt has not come down for a meal in 5 days. Pt's  and  agree pt needs hospice care to be comfortable and so that her elderly  doesn't have to drive her to appointments. All these changes have happened in the past two weeks. They are requesting an external referral to [facility]"  "

## 2018-03-28 NOTE — TELEPHONE ENCOUNTER
I replied to portal message that they may need to consider assisted living or even nursing home if she continues to decline. Now certainly if she is worsening acutely and he thinks she needs urgent care like ER that can be an alternative if something is needed acutely. Also when like that they should not be using pain meds or other sedative. And certainly we can see her whenever.

## 2018-03-28 NOTE — TELEPHONE ENCOUNTER
Please fax the request to the Hospice company that the family requested for an evaluation. Please let them know that Dr. Dinh will be back on Tuesday.

## 2018-04-23 ENCOUNTER — TELEPHONE (OUTPATIENT)
Dept: INTERNAL MEDICINE | Facility: CLINIC | Age: 83
End: 2018-04-23

## 2018-04-23 NOTE — TELEPHONE ENCOUNTER
----- Message from Issa Steele sent at 4/23/2018 10:21 AM CDT -----  Contact: Saundra with [facility]   Saundra called to advise pt passed on 04/14/ 18

## 2018-06-07 ENCOUNTER — PES CALL (OUTPATIENT)
Dept: ADMINISTRATIVE | Facility: CLINIC | Age: 83
End: 2018-06-07